# Patient Record
Sex: FEMALE | Race: WHITE | NOT HISPANIC OR LATINO | Employment: STUDENT | URBAN - METROPOLITAN AREA
[De-identification: names, ages, dates, MRNs, and addresses within clinical notes are randomized per-mention and may not be internally consistent; named-entity substitution may affect disease eponyms.]

---

## 2023-08-01 ENCOUNTER — OFFICE VISIT (OUTPATIENT)
Dept: PRIMARY CARE | Facility: CLINIC | Age: 18
End: 2023-08-01
Payer: COMMERCIAL

## 2023-08-01 VITALS — HEART RATE: 81 BPM | DIASTOLIC BLOOD PRESSURE: 74 MMHG | OXYGEN SATURATION: 98 % | SYSTOLIC BLOOD PRESSURE: 120 MMHG

## 2023-08-01 DIAGNOSIS — Z91.030 BEE STING ALLERGY: ICD-10-CM

## 2023-08-01 DIAGNOSIS — Z00.00 WELL ADULT EXAM: Primary | ICD-10-CM

## 2023-08-01 PROBLEM — R05.9 COUGH: Status: RESOLVED | Noted: 2023-08-01 | Resolved: 2023-08-01

## 2023-08-01 PROBLEM — R52 PAIN IN SCAR: Status: ACTIVE | Noted: 2023-08-01

## 2023-08-01 PROBLEM — L01.00 IMPETIGO: Status: RESOLVED | Noted: 2023-08-01 | Resolved: 2023-08-01

## 2023-08-01 PROBLEM — L91.0 SCAR, HYPERTROPHIC: Status: ACTIVE | Noted: 2023-08-01

## 2023-08-01 PROBLEM — T63.441A BEE STING: Status: RESOLVED | Noted: 2023-08-01 | Resolved: 2023-08-01

## 2023-08-01 PROBLEM — L25.5 RHUS DERMATITIS: Status: ACTIVE | Noted: 2023-08-01

## 2023-08-01 PROBLEM — L85.8 KERATOSIS PILARIS: Status: ACTIVE | Noted: 2023-08-01

## 2023-08-01 PROBLEM — L90.5 PAIN IN SCAR: Status: ACTIVE | Noted: 2023-08-01

## 2023-08-01 PROCEDURE — 99395 PREV VISIT EST AGE 18-39: CPT | Performed by: FAMILY MEDICINE

## 2023-08-01 PROCEDURE — 1036F TOBACCO NON-USER: CPT | Performed by: FAMILY MEDICINE

## 2023-08-01 RX ORDER — BENZOYL PEROXIDE 50 MG/ML
LIQUID TOPICAL
COMMUNITY
Start: 2023-07-25

## 2023-08-01 RX ORDER — CLINDAMYCIN PHOSPHATE 10 UG/ML
LOTION TOPICAL
COMMUNITY
Start: 2023-07-25

## 2023-08-01 RX ORDER — DOXYCYCLINE 100 MG/1
100 CAPSULE ORAL
COMMUNITY
Start: 2023-07-25

## 2023-08-01 RX ORDER — TRETINOIN 0.25 MG/G
CREAM TOPICAL
COMMUNITY
Start: 2023-07-25

## 2023-08-01 RX ORDER — EPINEPHRINE 0.3 MG/.3ML
1 INJECTION SUBCUTANEOUS AS NEEDED
Qty: 2 EACH | Refills: 1 | Status: SHIPPED | OUTPATIENT
Start: 2023-08-01

## 2023-08-01 RX ORDER — EPINEPHRINE 0.3 MG/.3ML
INJECTION SUBCUTANEOUS
COMMUNITY
Start: 2018-05-15 | End: 2023-08-01 | Stop reason: SDUPTHER

## 2023-08-01 ASSESSMENT — PROMIS GLOBAL HEALTH SCALE
RATE_AVERAGE_PAIN: 1
EMOTIONAL_PROBLEMS: OFTEN
RATE_PHYSICAL_HEALTH: GOOD
RATE_MENTAL_HEALTH: FAIR
CARRYOUT_SOCIAL_ACTIVITIES: VERY GOOD
RATE_SOCIAL_SATISFACTION: GOOD
RATE_AVERAGE_FATIGUE: MODERATE
RATE_GENERAL_HEALTH: GOOD
RATE_QUALITY_OF_LIFE: GOOD
CARRYOUT_PHYSICAL_ACTIVITIES: COMPLETELY

## 2023-08-01 NOTE — PROGRESS NOTES
ROS :  ( No or Yes )  Do you have:  Any eye problems:    N  2. Frequent nasal congestion or sneezing:  Y  3. Difficulty hearing:  N  4. Ear problems:   N  Are you troubled by:  5. Asthma or wheezing:   N  6. Frequent cough:   Y, recently for month and a half, went away  7. Shortness of breath:Y,with  8. Hemoptysis: N  9. Hx of TB: N  Do you have or have you been told you had:  10. High blood pressure: N  11. Heart disease: N  12. Heart murmur: N  Do you ever have:  13. Chest pain or pressure with exertion:N  14. Leg pains with walking up hill: N  15. Fast heartbeat or palpitations: Y, anxiety  16. Varicose veins: N  Do you have or are you troubled by:  17. Difficulty swallowing foods or liquids: N  18. Abdominal pains: N  19. Frequent indigestion or heartburn: N  20. Constipation: N  21. Diarrhea or loose stools: N  Has there been a definite change:  22. In weight recently: N  23. In bowel movements: N  Have you ever had or been told you have:  24. An ulcer: N  25. Black stools: N  26. Jaundice, hepatitis or liver problems: N  27. Gallstones or gallbladder problems: N  28. Stomach or intestinal problems: N  Have you ever:  29. Vomited blood : N  30. Blood in bowel movements: N  31. Been anemic or been treated for blood problems: N  32. Had sickle cell trait or anemia: N  33. Been refused as a blood donor:   Have you had or do you have:  34. Problems with your kidney, bladder, or prostate: N  35. Loss of control of your urine: N  36. Pain or burning with urination: N  37. Blood in your urine: N  38. Trouble starting flow of urine: N  39. Frequent urination at night: N  Have you ever been treated for or told you had:  40. Venereal disease: N  Do you have:  41. Any skin problems: Y, acne, seeing derm  42. Diabetes: N  43. Thyroid disease: N  Are you troubled by:  44. Frequent back pain: N  45. Pain or swelling around joints: N  Have you ever:  46. Broken any bones: N  Are you troubled by:  47. Frequent headaches:  N  48. Dizziness: Y  49. Had Seizures or convulsions: N  50. Temporarily lost control of your hand or foot : N   51. Had a stroke or been paralyzed : N  52. Temporarily lost your ability to speak: N  53. Fainted or lost consciousness: N  Have you ever had:  54. Hallucinations: N  55. Much trouble with Nervousness: Y  56. Do you take medications for your nerves: N  57. Trouble falling asleep or staying asleep: Y  58. Do you feel tired even after a good night sleep: Y  59. Do you often feel down in the dumps or depressed: Y  60. Do you often feel like crying without any reason: Y  61. Do you think that you may be using alcohol excessively: N  62. Do you use any street drugs : N    Do have any other medical problems that are concerning to you :   Subjective   Patient ID: Bry Bojorquez is a 17 y.o. female who presents for Annual Exam (Had a cough for a month, but it seems to be gone now, had it 4 days ago. ).    HPI     Review of Systems    Objective   /74   Pulse 81   LMP 08/01/2023   SpO2 98%     Physical Exam  HENT:      Head: Normocephalic and atraumatic.      Nose: Nose normal.      Mouth/Throat:      Mouth: Mucous membranes are moist.      Pharynx: No oropharyngeal exudate.   Eyes:      Extraocular Movements: Extraocular movements intact.      Conjunctiva/sclera: Conjunctivae normal.      Pupils: Pupils are equal, round, and reactive to light.   Cardiovascular:      Rate and Rhythm: Normal rate and regular rhythm.   Pulmonary:      Effort: Pulmonary effort is normal.   Abdominal:      General: There is no distension.      Palpations: Abdomen is soft.   Musculoskeletal:      Cervical back: Normal range of motion and neck supple.   Lymphadenopathy:      Cervical: No cervical adenopathy.   Neurological:      General: No focal deficit present.      Mental Status: She is alert.   Psychiatric:         Attention and Perception: Attention normal.         Speech: Speech normal.         Behavior: Behavior is  cooperative.         Assessment/Plan   Diagnoses and all orders for this visit:  Well adult exam  Bee sting allergy  -     EPINEPHrine 0.3 mg/0.3 mL injection syringe; Inject 0.3 mL (0.3 mg) as directed if needed for anaphylaxis. As Directed    Health maintenance and lifestyle modification discussed  Diet exercise weight discussed  Call if anxiety worsens  RTC 1 year sooner if any problems arise

## 2023-08-14 ENCOUNTER — APPOINTMENT (OUTPATIENT)
Dept: PRIMARY CARE | Facility: CLINIC | Age: 18
End: 2023-08-14
Payer: COMMERCIAL

## 2024-09-13 ENCOUNTER — OFFICE VISIT (OUTPATIENT)
Dept: PRIMARY CARE | Facility: CLINIC | Age: 19
End: 2024-09-13
Payer: COMMERCIAL

## 2024-09-13 VITALS
BODY MASS INDEX: 28.28 KG/M2 | OXYGEN SATURATION: 98 % | HEART RATE: 78 BPM | WEIGHT: 176 LBS | TEMPERATURE: 99 F | HEIGHT: 66 IN | DIASTOLIC BLOOD PRESSURE: 80 MMHG | SYSTOLIC BLOOD PRESSURE: 122 MMHG

## 2024-09-13 DIAGNOSIS — R53.83 FATIGUE, UNSPECIFIED TYPE: ICD-10-CM

## 2024-09-13 DIAGNOSIS — Z00.00 WELL ADULT EXAM: Primary | ICD-10-CM

## 2024-09-13 PROCEDURE — 1036F TOBACCO NON-USER: CPT | Performed by: FAMILY MEDICINE

## 2024-09-13 PROCEDURE — 3008F BODY MASS INDEX DOCD: CPT | Performed by: FAMILY MEDICINE

## 2024-09-13 PROCEDURE — 99395 PREV VISIT EST AGE 18-39: CPT | Performed by: FAMILY MEDICINE

## 2024-09-13 ASSESSMENT — PROMIS GLOBAL HEALTH SCALE
RATE_PHYSICAL_HEALTH: GOOD
RATE_QUALITY_OF_LIFE: GOOD
EMOTIONAL_PROBLEMS: OFTEN
RATE_MENTAL_HEALTH: FAIR
CARRYOUT_PHYSICAL_ACTIVITIES: COMPLETELY
CARRYOUT_SOCIAL_ACTIVITIES: GOOD
RATE_AVERAGE_FATIGUE: MILD
RATE_GENERAL_HEALTH: GOOD
RATE_AVERAGE_PAIN: 1
RATE_SOCIAL_SATISFACTION: GOOD

## 2024-09-13 ASSESSMENT — PATIENT HEALTH QUESTIONNAIRE - PHQ9
1. LITTLE INTEREST OR PLEASURE IN DOING THINGS: NOT AT ALL
SUM OF ALL RESPONSES TO PHQ9 QUESTIONS 1 AND 2: 0
2. FEELING DOWN, DEPRESSED OR HOPELESS: NOT AT ALL

## 2024-09-13 NOTE — PROGRESS NOTES
Subjective   Patient ID: Bry Bojorquez is a 18 y.o. female who presents for Annual Exam. Not fasting for labs. States her weight increased the past few months despite her healthy diet; not exercising. 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 States her periods have been different the past four cycles; more cramps. Intends on scheduling an appt with GYN. Last concern is her legs fall asleep at least once a day; wonders if she may have a circulation problem.     HPI ROS :  ( No or Yes )  Any eye problems:    N  Frequent nasal congestion or sneezing:  N  Difficulty hearing:  N  Ear problems:   N  Asthma or wheezing:   N  Frequent cough:   N  Shortness of breath:N  Hemoptysis: N  Hx of TB: N  High blood pressure: N  Heart disease: N  Heart murmur:N  Chest pain or pressure with exertion:N  Leg pains with walking up hill: N  Fast heartbeat or palpitations:N  Varicose veins: N  Difficulty swallowing foods or liquids: N  Abdominal pains: N  Frequent indigestion or heartburn: N  Constipation: N  Diarrhea or loose stools: N  Weight changes recently: y  Change in bowel movements: N  An ulcer: N  Black stools: N  Jaundice, hepatitis or liver problems: N  Gallstones or gallbladder problems: N  Stomach or intestinal problems: N  Vomited blood : N  Blood in bowel movements: N  Sickle cell trait  or Anemia: N  Been refused as a blood donor: N  Problems with her kidney, bladder, or prostate: N  Loss of control of your urine: N  Pain or burning with urination: N  Blood in her urine: N  Trouble starting flow of urine: N  Frequent urination at night: N  History of venereal disease: N  Any skin problems: y  Diabetes: N  Thyroid disease: N  Frequent back pain: N  Pain or swelling around joints: N  Broken any bones: N  Frequent headaches: N  Dizziness: y  Have you ever had Seizures or convulsions: N  Have you ever temporarily lost control of your hand or foot : N   Had a stroke or been paralyzed :  N  Temporarily lost your ability to speak: N  Fainted or lost consciousness: N  Hallucinations: N  Nervousness: N  Do you take medications for your nerves: y  Trouble falling asleep or staying asleep: y  Do you feel tired even after a good night sleep: y  Do you feel down in the dumps or depressed: N  Frequent crying: N  Using alcohol excessively: N  Any street drug use : N  Do have any other medical problems that are concerns :      Review of Systems    Objective   There were no vitals taken for this visit.    Physical Exam    Assessment/Plan

## 2024-09-13 NOTE — PROGRESS NOTES
Subjective   Patient ID: Bry Bojorquez is a 18 y.o. female who presents for Annual Exam.  Patient ID: Bry Bojorquez is a 18 y.o. female who presents for Annual Exam. Not fasting for labs. States her weight increased the past few months despite her healthy diet; not exercising. 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 States her periods have been different the past four cycles; more cramps. Intends on scheduling an appt with GYN. Last concern is her legs fall asleep at least once a day; wonders if she may have a circulation problem.      HPI ROS :  ( No or Yes )  Any eye problems:    N  Frequent nasal congestion or sneezing:  N  Difficulty hearing:  N  Ear problems:   N  Asthma or wheezing:   N  Frequent cough:   N  Shortness of breath:N  Hemoptysis: N  Hx of TB: N  High blood pressure: N  Heart disease: N  Heart murmur:N  Chest pain or pressure with exertion:N  Leg pains with walking up hill: N  Fast heartbeat or palpitations:N  Varicose veins: N  Difficulty swallowing foods or liquids: N  Abdominal pains: N  Frequent indigestion or heartburn: N  Constipation: N  Diarrhea or loose stools: N  Weight changes recently: y, gain  Change in bowel movements: N  An ulcer: N  Black stools: N  Jaundice, hepatitis or liver problems: N  Gallstones or gallbladder problems: N  Stomach or intestinal problems: N  Vomited blood : N  Blood in bowel movements: N  Sickle cell trait  or Anemia: N  Been refused as a blood donor: N  Problems with her kidney, bladder, or prostate: N  Loss of control of your urine: N  Pain or burning with urination: N  Blood in her urine: N  Trouble starting flow of urine: N  Frequent urination at night: N  History of venereal disease: N  Any skin problems: y  Diabetes: N  Thyroid disease: N  Frequent back pain: N  Pain or swelling around joints: N  Broken any bones: N  Frequent headaches: N  Dizziness: y  Have you ever had Seizures or convulsions: N  Have you  "ever temporarily lost control of your hand or foot : N   Had a stroke or been paralyzed : N  Temporarily lost your ability to speak: N  Fainted or lost consciousness: N  Hallucinations: N  Nervousness: N  Do you take medications for your nerves: N  Trouble falling asleep or staying asleep: y  Do you feel tired even after a good night sleep: y  Do you feel down in the dumps or depressed: N  Frequent crying: N  Using alcohol excessively: N  Any street drug use : N  Do have any other medical problems that are concerns :        HPI     Review of Systems    Objective   /80   Pulse 78   Temp 37.2 °C (99 °F)   Ht 1.683 m (5' 6.25\")   Wt 79.8 kg (176 lb)   LMP 09/12/2024   SpO2 98%   BMI 28.19 kg/m²     Physical Exam  HENT:      Head: Normocephalic and atraumatic.      Nose: Nose normal.      Mouth/Throat:      Mouth: Mucous membranes are moist.      Pharynx: No oropharyngeal exudate.   Eyes:      Extraocular Movements: Extraocular movements intact.      Conjunctiva/sclera: Conjunctivae normal.      Pupils: Pupils are equal, round, and reactive to light.   Cardiovascular:      Rate and Rhythm: Normal rate and regular rhythm.   Pulmonary:      Effort: Pulmonary effort is normal.      Breath sounds: Normal breath sounds.   Abdominal:      General: There is no distension.      Palpations: Abdomen is soft.   Musculoskeletal:      Cervical back: Normal range of motion and neck supple.   Lymphadenopathy:      Cervical: No cervical adenopathy.   Neurological:      General: No focal deficit present.      Mental Status: She is alert.   Psychiatric:         Attention and Perception: Attention normal.         Speech: Speech normal.         Behavior: Behavior is cooperative.         Assessment/Plan   Problem List Items Addressed This Visit    None  Visit Diagnoses         Codes    Well adult exam    -  Primary Z00.00    Relevant Orders    Thyroid Stimulating Hormone    Thyroxine, Free    Lipid Panel    CBC and Auto " Differential    Comprehensive Metabolic Panel    Cortisol    Fatigue, unspecified type     R53.83    Relevant Orders    Thyroid Stimulating Hormone    Thyroxine, Free    Lipid Panel    CBC and Auto Differential    Comprehensive Metabolic Panel    Cortisol          HM LM discussed including diet exercise and weight loss  Recheck 1 year sooner if any issues arise

## 2024-09-16 ENCOUNTER — LAB (OUTPATIENT)
Dept: LAB | Facility: LAB | Age: 19
End: 2024-09-16
Payer: COMMERCIAL

## 2024-09-16 DIAGNOSIS — R53.83 FATIGUE, UNSPECIFIED TYPE: ICD-10-CM

## 2024-09-16 DIAGNOSIS — Z00.00 WELL ADULT EXAM: ICD-10-CM

## 2024-09-16 LAB
ALBUMIN SERPL BCP-MCNC: 4.7 G/DL (ref 3.4–5)
ALP SERPL-CCNC: 34 U/L (ref 33–110)
ALT SERPL W P-5'-P-CCNC: 13 U/L (ref 7–45)
ANION GAP SERPL CALC-SCNC: 11 MMOL/L (ref 10–20)
AST SERPL W P-5'-P-CCNC: 14 U/L (ref 9–39)
BASOPHILS # BLD AUTO: 0.03 X10*3/UL (ref 0–0.1)
BASOPHILS NFR BLD AUTO: 0.4 %
BILIRUB SERPL-MCNC: 0.6 MG/DL (ref 0–1.2)
BUN SERPL-MCNC: 8 MG/DL (ref 6–23)
CALCIUM SERPL-MCNC: 9.4 MG/DL (ref 8.6–10.3)
CHLORIDE SERPL-SCNC: 107 MMOL/L (ref 98–107)
CHOLEST SERPL-MCNC: 151 MG/DL (ref 0–199)
CHOLESTEROL/HDL RATIO: 2.8
CO2 SERPL-SCNC: 27 MMOL/L (ref 21–32)
CORTIS SERPL-MCNC: 16 UG/DL (ref 2.5–20)
CREAT SERPL-MCNC: 0.7 MG/DL (ref 0.5–1.05)
EGFRCR SERPLBLD CKD-EPI 2021: >90 ML/MIN/1.73M*2
EOSINOPHIL # BLD AUTO: 0.07 X10*3/UL (ref 0–0.7)
EOSINOPHIL NFR BLD AUTO: 1 %
ERYTHROCYTE [DISTWIDTH] IN BLOOD BY AUTOMATED COUNT: 12.1 % (ref 11.5–14.5)
GLUCOSE SERPL-MCNC: 88 MG/DL (ref 74–99)
HCT VFR BLD AUTO: 43 % (ref 36–46)
HDLC SERPL-MCNC: 53.6 MG/DL
HGB BLD-MCNC: 14.3 G/DL (ref 12–16)
IMM GRANULOCYTES # BLD AUTO: 0.02 X10*3/UL (ref 0–0.7)
IMM GRANULOCYTES NFR BLD AUTO: 0.3 % (ref 0–0.9)
LDLC SERPL CALC-MCNC: 88 MG/DL
LYMPHOCYTES # BLD AUTO: 1.96 X10*3/UL (ref 1.2–4.8)
LYMPHOCYTES NFR BLD AUTO: 29.4 %
MCH RBC QN AUTO: 29.1 PG (ref 26–34)
MCHC RBC AUTO-ENTMCNC: 33.3 G/DL (ref 32–36)
MCV RBC AUTO: 88 FL (ref 80–100)
MONOCYTES # BLD AUTO: 0.51 X10*3/UL (ref 0.1–1)
MONOCYTES NFR BLD AUTO: 7.6 %
NEUTROPHILS # BLD AUTO: 4.08 X10*3/UL (ref 1.2–7.7)
NEUTROPHILS NFR BLD AUTO: 61.3 %
NON HDL CHOLESTEROL: 97 MG/DL (ref 0–119)
NRBC BLD-RTO: 0 /100 WBCS (ref 0–0)
PLATELET # BLD AUTO: 170 X10*3/UL (ref 150–450)
POTASSIUM SERPL-SCNC: 4.2 MMOL/L (ref 3.5–5.3)
PROT SERPL-MCNC: 6.8 G/DL (ref 6.4–8.2)
RBC # BLD AUTO: 4.91 X10*6/UL (ref 4–5.2)
SODIUM SERPL-SCNC: 141 MMOL/L (ref 136–145)
T4 FREE SERPL-MCNC: 0.9 NG/DL (ref 0.61–1.12)
TRIGL SERPL-MCNC: 45 MG/DL (ref 0–149)
TSH SERPL-ACNC: 1.84 MIU/L (ref 0.44–3.98)
VLDL: 9 MG/DL (ref 0–40)
WBC # BLD AUTO: 6.7 X10*3/UL (ref 4.4–11.3)

## 2024-09-16 PROCEDURE — 80053 COMPREHEN METABOLIC PANEL: CPT

## 2024-09-16 PROCEDURE — 80061 LIPID PANEL: CPT

## 2024-09-16 PROCEDURE — 84439 ASSAY OF FREE THYROXINE: CPT

## 2024-09-16 PROCEDURE — 85025 COMPLETE CBC W/AUTO DIFF WBC: CPT

## 2024-09-16 PROCEDURE — 82533 TOTAL CORTISOL: CPT

## 2024-09-16 PROCEDURE — 36415 COLL VENOUS BLD VENIPUNCTURE: CPT

## 2024-09-16 PROCEDURE — 84443 ASSAY THYROID STIM HORMONE: CPT

## 2025-01-25 ENCOUNTER — TELEMEDICINE (OUTPATIENT)
Dept: PRIMARY CARE | Facility: CLINIC | Age: 20
End: 2025-01-25
Payer: COMMERCIAL

## 2025-01-25 DIAGNOSIS — R05.1 ACUTE COUGH: Primary | ICD-10-CM

## 2025-01-25 PROCEDURE — 1036F TOBACCO NON-USER: CPT | Performed by: NURSE PRACTITIONER

## 2025-01-25 PROCEDURE — 99213 OFFICE O/P EST LOW 20 MIN: CPT | Performed by: NURSE PRACTITIONER

## 2025-01-25 RX ORDER — METHYLPREDNISOLONE 4 MG/1
TABLET ORAL
Qty: 21 TABLET | Refills: 0 | Status: SHIPPED | OUTPATIENT
Start: 2025-01-25 | End: 2025-01-31

## 2025-01-25 NOTE — PROGRESS NOTES
Bry Bojorquez is a 19 y.o. female who presents virtually today for a sick visit    I performed this visit using real-time telehealth tools, including an audio/video connection between Bry Bojorquez  and myself, Mela Bagley CNP,  within the state of [place].  Consent has been obtained for this visit.  I have verbally confirmed with Bry Bojorquez (or parent if under 18) that they are physically located in the [facility] during this virtual visit.  Telemedicine appropriate evaluation completed.  Unable to perform complete physical exam due to virtual visit.      Chief Complaint   Patient presents with    Cough    Chest Pain       Symptoms: Cough, chest wall pain   Denies fever/chills  Denies SOB     Symptom onset has been acute for a time period of 4 day(s).     Severity is described as mild.     Course of her symptoms over time is acute.    Has taken: Ibuprofen at 10:00 am     Allergies   Allergen Reactions    Bee Venom Protein (Honey Bee) Anaphylaxis       Review of Systems  ROS was completed and all systems are negative with the exception of what was noted in the the HPI.       Objective   Vitals:  There were no vitals taken for this visit.      Current Outpatient Medications   Medication Instructions    benzoyl peroxide 5 % external wash Wash once daily    clindamycin (Cleocin T) 1 % lotion Apply to affected areas every morning after washing    doxycycline (VIBRAMYCIN) 100 mg, oral, 2 times daily after meals    EPINEPHrine (EPIPEN) 0.3 mg, injection, As needed, As Directed    methylPREDNISolone (Medrol Dospak) 4 mg tablets Take as directed on package.    tretinoin (Retin-A) 0.025 % cream Apply a small amount to face nightly         Physical Exam  Pulmonary:      Effort: Pulmonary effort is normal.   Neurological:      Mental Status: She is alert and oriented to person, place, and time.   Psychiatric:         Mood and Affect: Mood normal.         Behavior: Behavior normal.         Thought Content:  Thought content normal.         Assessment & Plan  Acute cough  ? Costochondritis from coughing    Rx Medrol Dosepak   Encouraged OTC Ibuprofen, Advil or Motrin  Heat to chest wall as needed     Orders:    methylPREDNISolone (Medrol Dospak) 4 mg tablets; Take as directed on package.       FU WITH PCP IF NOT BETTER

## 2025-01-25 NOTE — PATIENT INSTRUCTIONS
Thank you for seeing me today Bry Bojorquez, it was a pleasure to meet you!    Assessment, plans, tests, and follow up discussed with patient and patient verbalized understanding.   Bry   was given an opportunity to ask questions and any concerns were addressed including but not limited to need for an in-person evaluation.     Patient verbalized understanding of treatment plan and need for in person follow up if his/her symptoms do not improve or worsen.     Acute cough  ? Costochondritis from coughing    Rx Medrol Dosepak   Encouraged OTC Ibuprofen, Advil or Motrin  Heat to chest wall as needed     Orders:    methylPREDNISolone (Medrol Dospak) 4 mg tablets; Take as directed on package.       FU WITH PCP IF NOT BETTER

## 2025-05-16 ENCOUNTER — OFFICE VISIT (OUTPATIENT)
Dept: URGENT CARE | Age: 20
End: 2025-05-16
Payer: COMMERCIAL

## 2025-05-16 ENCOUNTER — APPOINTMENT (OUTPATIENT)
Dept: URGENT CARE | Age: 20
End: 2025-05-16
Payer: COMMERCIAL

## 2025-05-16 ENCOUNTER — ANCILLARY PROCEDURE (OUTPATIENT)
Dept: URGENT CARE | Age: 20
End: 2025-05-16
Payer: COMMERCIAL

## 2025-05-16 VITALS
SYSTOLIC BLOOD PRESSURE: 136 MMHG | BODY MASS INDEX: 26.43 KG/M2 | DIASTOLIC BLOOD PRESSURE: 85 MMHG | RESPIRATION RATE: 20 BRPM | WEIGHT: 165 LBS | TEMPERATURE: 98.5 F | HEART RATE: 110 BPM | OXYGEN SATURATION: 99 %

## 2025-05-16 DIAGNOSIS — M79.672 LEFT FOOT PAIN: ICD-10-CM

## 2025-05-16 DIAGNOSIS — M79.672 LEFT FOOT PAIN: Primary | ICD-10-CM

## 2025-05-16 PROCEDURE — 73630 X-RAY EXAM OF FOOT: CPT | Mod: LEFT SIDE

## 2025-05-16 PROCEDURE — 99203 OFFICE O/P NEW LOW 30 MIN: CPT

## 2025-05-16 RX ORDER — PREDNISONE 20 MG/1
20 TABLET ORAL DAILY
Qty: 5 TABLET | Refills: 0 | Status: SHIPPED | OUTPATIENT
Start: 2025-05-16 | End: 2025-05-21

## 2025-05-16 NOTE — PROGRESS NOTES
Subjective   Patient ID: Bry Bojorquez is a 19 y.o. female. They present today with a chief complaint of left foot pain (For 2 weeks; no injury).    History of Present Illness  HPI    Past Medical History  Allergies as of 05/16/2025 - Reviewed 05/16/2025   Allergen Reaction Noted    Bee venom protein (honey bee) Anaphylaxis 08/01/2023       Prescriptions Prior to Admission[1]     Medical History[2]    Surgical History[3]     reports that she has never smoked. She has never used smokeless tobacco. She reports that she does not drink alcohol and does not use drugs.    Review of Systems  Review of Systems                               Objective    Vitals:    05/16/25 1447   BP: 136/85   BP Location: Left arm   Patient Position: Sitting   BP Cuff Size: Adult   Pulse: 110   Resp: 20   Temp: 36.9 °C (98.5 °F)   TempSrc: Temporal   SpO2: 99%   Weight: 74.8 kg (165 lb)     Patient's last menstrual period was 04/20/2025 (exact date).    Physical Exam    Procedures    Point of Care Test & Imaging Results from this visit  No results found for this visit on 05/16/25.   Imaging  No results found.    Cardiology, Vascular, and Other Imaging  No other imaging results found for the past 2 days      Diagnostic study results (if any) were reviewed by JALEESA Grijalva.    Assessment/Plan   Allergies, medications, history, and pertinent labs/EKGs/Imaging reviewed by JALEESA Grijalva.     Medical Decision Making  ***    Orders and Diagnoses  Diagnoses and all orders for this visit:  Left foot pain      Medical Admin Record      Patient disposition: { Disposition:[id number]}    Electronically signed by JALEESA Grijalva  2:51 PM           [1] (Not in a hospital admission)  [2]   Past Medical History:  Diagnosis Date    Bee sting 08/01/2023    Impetigo 08/01/2023    Other conditions influencing health status     No significant past medical history   [3] No past surgical history on  file.

## 2025-06-12 ENCOUNTER — E-VISIT (OUTPATIENT)
Dept: PRIMARY CARE | Facility: CLINIC | Age: 20
End: 2025-06-12
Payer: COMMERCIAL

## 2025-07-02 ENCOUNTER — APPOINTMENT (OUTPATIENT)
Dept: PRIMARY CARE | Facility: CLINIC | Age: 20
End: 2025-07-02
Payer: COMMERCIAL

## 2025-07-02 VITALS
HEIGHT: 66 IN | WEIGHT: 165 LBS | DIASTOLIC BLOOD PRESSURE: 74 MMHG | BODY MASS INDEX: 26.52 KG/M2 | SYSTOLIC BLOOD PRESSURE: 118 MMHG | TEMPERATURE: 97.3 F

## 2025-07-02 DIAGNOSIS — F41.1 GENERALIZED ANXIETY DISORDER: Primary | ICD-10-CM

## 2025-07-02 PROCEDURE — 1036F TOBACCO NON-USER: CPT | Performed by: FAMILY MEDICINE

## 2025-07-02 PROCEDURE — 99214 OFFICE O/P EST MOD 30 MIN: CPT | Performed by: FAMILY MEDICINE

## 2025-07-02 PROCEDURE — 3008F BODY MASS INDEX DOCD: CPT | Performed by: FAMILY MEDICINE

## 2025-07-02 RX ORDER — FLUOXETINE 20 MG/1
20 TABLET ORAL DAILY
Qty: 30 TABLET | Refills: 1 | Status: SHIPPED | OUTPATIENT
Start: 2025-07-02 | End: 2025-08-31

## 2025-07-02 ASSESSMENT — PATIENT HEALTH QUESTIONNAIRE - PHQ9
8. MOVING OR SPEAKING SO SLOWLY THAT OTHER PEOPLE COULD HAVE NOTICED. OR THE OPPOSITE, BEING SO FIGETY OR RESTLESS THAT YOU HAVE BEEN MOVING AROUND A LOT MORE THAN USUAL: NEARLY EVERY DAY
6. FEELING BAD ABOUT YOURSELF - OR THAT YOU ARE A FAILURE OR HAVE LET YOURSELF OR YOUR FAMILY DOWN: MORE THAN HALF THE DAYS
3. TROUBLE FALLING OR STAYING ASLEEP OR SLEEPING TOO MUCH: MORE THAN HALF THE DAYS
SUM OF ALL RESPONSES TO PHQ QUESTIONS 1-9: 16
SUM OF ALL RESPONSES TO PHQ9 QUESTIONS 1 AND 2: 4
1. LITTLE INTEREST OR PLEASURE IN DOING THINGS: MORE THAN HALF THE DAYS
2. FEELING DOWN, DEPRESSED OR HOPELESS: MORE THAN HALF THE DAYS
4. FEELING TIRED OR HAVING LITTLE ENERGY: NEARLY EVERY DAY
7. TROUBLE CONCENTRATING ON THINGS, SUCH AS READING THE NEWSPAPER OR WATCHING TELEVISION: SEVERAL DAYS
9. THOUGHTS THAT YOU WOULD BE BETTER OFF DEAD, OR OF HURTING YOURSELF: NOT AT ALL
5. POOR APPETITE OR OVEREATING: SEVERAL DAYS

## 2025-07-02 ASSESSMENT — ENCOUNTER SYMPTOMS
APPETITE CHANGE: 0
UNEXPECTED WEIGHT CHANGE: 0
NAUSEA: 0

## 2025-07-02 NOTE — PROGRESS NOTES
"Subjective   Patient ID: Bry Bojorquez is a 19 y.o. female who presents for Sick Visit (Bry is here today for same day sick visit with c/o increased anxiety. Pt does not currently take medication for her anxiety but would like to discuss starting something. ).    HPI   C/o anxiety, feeling on edge  Fatigue  Heart pounds, sweats  Worse in social situations  OCD: wash hands a lot, checks phone, social media, shower if feel dirty, put clean clothes back in dirty if it touches the floor or if she puts it on even if just for a second, has to knock on wood if thinks bad thought to protect others  Occasional marijuana  Occasional alcohol    Review of Systems   Constitutional:  Negative for appetite change and unexpected weight change.   Eyes:  Negative for visual disturbance.   Gastrointestinal:  Negative for nausea.       Objective   /74   Temp 36.3 °C (97.3 °F)   Ht 1.683 m (5' 6.25\")   Wt 74.8 kg (165 lb)   BMI 26.43 kg/m²     Physical Exam  HENT:      Head: Normocephalic and atraumatic.      Nose: Nose normal.      Mouth/Throat:      Mouth: Mucous membranes are moist.      Pharynx: No oropharyngeal exudate.   Eyes:      Extraocular Movements: Extraocular movements intact.      Conjunctiva/sclera: Conjunctivae normal.      Pupils: Pupils are equal, round, and reactive to light.   Cardiovascular:      Rate and Rhythm: Normal rate and regular rhythm.   Pulmonary:      Effort: Pulmonary effort is normal.      Breath sounds: Normal breath sounds.   Abdominal:      General: There is no distension.      Palpations: Abdomen is soft.   Musculoskeletal:      Cervical back: Normal range of motion and neck supple.   Lymphadenopathy:      Cervical: No cervical adenopathy.   Neurological:      General: No focal deficit present.      Mental Status: She is alert.   Psychiatric:         Attention and Perception: Attention normal.         Speech: Speech normal.         Behavior: Behavior is cooperative. "         Assessment/Plan   Problem List Items Addressed This Visit    None  Visit Diagnoses         Codes      Generalized anxiety disorder    -  Primary F41.1    Relevant Medications    FLUoxetine (PROzac) 20 mg tablet        Risk-benefit is discussed and add medication  She does not want to try sertraline which I think would be better given some OCD tendencies  Recheck here call in 3 to 4 weeks and RTC 4 to 8 weeks but sooner if any issues arise

## 2025-07-08 DIAGNOSIS — F41.1 GENERALIZED ANXIETY DISORDER: ICD-10-CM

## 2025-07-08 RX ORDER — FLUOXETINE 20 MG/1
20 CAPSULE ORAL DAILY
Qty: 30 CAPSULE | Refills: 1 | Status: SHIPPED | OUTPATIENT
Start: 2025-07-08 | End: 2025-09-06